# Patient Record
Sex: MALE | Race: WHITE
[De-identification: names, ages, dates, MRNs, and addresses within clinical notes are randomized per-mention and may not be internally consistent; named-entity substitution may affect disease eponyms.]

---

## 2018-02-01 ENCOUNTER — HOSPITAL ENCOUNTER (EMERGENCY)
Dept: HOSPITAL 11 - JP.ED | Age: 83
Discharge: NURSING, REHAB OR LONG-TERM CARE | End: 2018-02-01
Payer: MEDICARE

## 2018-02-01 DIAGNOSIS — E78.00: ICD-10-CM

## 2018-02-01 DIAGNOSIS — C92.00: ICD-10-CM

## 2018-02-01 DIAGNOSIS — Z88.1: ICD-10-CM

## 2018-02-01 DIAGNOSIS — I21.29: Primary | ICD-10-CM

## 2018-02-01 DIAGNOSIS — Z79.899: ICD-10-CM

## 2018-02-01 DIAGNOSIS — Z88.8: ICD-10-CM

## 2018-02-01 PROCEDURE — 96360 HYDRATION IV INFUSION INIT: CPT

## 2018-02-01 PROCEDURE — 99285 EMERGENCY DEPT VISIT HI MDM: CPT

## 2018-02-01 PROCEDURE — 74018 RADEX ABDOMEN 1 VIEW: CPT

## 2018-02-01 PROCEDURE — 36415 COLL VENOUS BLD VENIPUNCTURE: CPT

## 2018-02-01 PROCEDURE — 93005 ELECTROCARDIOGRAM TRACING: CPT

## 2018-02-01 PROCEDURE — 93010 ELECTROCARDIOGRAM REPORT: CPT

## 2018-02-01 PROCEDURE — 80048 BASIC METABOLIC PNL TOTAL CA: CPT

## 2018-02-01 PROCEDURE — 84484 ASSAY OF TROPONIN QUANT: CPT

## 2018-02-01 PROCEDURE — 87040 BLOOD CULTURE FOR BACTERIA: CPT

## 2018-02-01 PROCEDURE — 87804 INFLUENZA ASSAY W/OPTIC: CPT

## 2018-02-01 PROCEDURE — 96361 HYDRATE IV INFUSION ADD-ON: CPT

## 2018-02-01 PROCEDURE — 85025 COMPLETE CBC W/AUTO DIFF WBC: CPT

## 2018-02-01 PROCEDURE — 71045 X-RAY EXAM CHEST 1 VIEW: CPT

## 2018-02-01 PROCEDURE — 81001 URINALYSIS AUTO W/SCOPE: CPT

## 2018-02-01 NOTE — CR
Abdomen 1V Flat

 

HISTORY: Vomiting

 

COMPARISON: None

 

FINDINGS: Bowel gas pattern is nonobstructive. Scattered stool throughout nondilated colon. Mild dege
nerative change lumbar spine. Surgical clips in the left abdomen and pelvis.

 

Impression:

 

No evidence for acute abdominal process.

## 2018-02-01 NOTE — CR
Chest 1V Frontal

 

HISTORY: Hypoxia, weakness.

 

COMPARISON: CT chest 9/12/2012

 

FINDINGS: Rotated film moderately to the right extension waiting the mediastinal soft tissues to the 
right. The cardiac size is mildly enlarged. No acute congestive change or dense focal infiltrate.

 

Impression:

 

Rotated film. No focal acute infiltrates.

## 2018-02-01 NOTE — EDM.PDOC
ED HPI GENERAL MEDICAL PROBLEM





- General


Chief Complaint: General


Stated Complaint: MEDICAL VIA NORTH


Time Seen by Provider: 02/01/18 03:04


Source of Information: Reports: Family


History Limitations: Reports: Other (patient not talking at time of exam)





- History of Present Illness


INITIAL COMMENTS - FREE TEXT/NARRATIVE: 





84 yo male has been ill for 3 days. Family noted that he was too weak to walk 

tonight so called EMS to transport. Vomited twice yesterday. Incontinent of 

urine recently. Low grade fever. 


Onset: Gradual


Onset Date: 01/29/18


Duration: Day(s):, Getting Worse


Location: Reports: Generalized


Quality: Reports: Other (pain not reported)


Severity: Moderate


Improves with: Reports: None


Worsens with: Reports: Other (? time)


Context: Reports: Other (umknown)


Associated Symptoms: Reports: Malaise, Nausea/Vomiting, Weakness


Treatments PTA: Reports: Other (see below) (none)


  ** denies pain


Pain Score (Numeric/FACES): 0





- Related Data


 Allergies











Allergy/AdvReac Type Severity Reaction Status Date / Time


 


amoxicillin [From Augmentin] Allergy  Itching Verified 02/01/18 03:04


 


clavulanic acid Allergy  Itching Verified 02/01/18 03:04





[From Augmentin]     


 


dutasteride [From Avodart] Allergy  Other Verified 02/01/18 03:04


 


lisinopril Allergy  Other Verified 02/01/18 03:04


 


meperidine Allergy  Cannot Verified 02/01/18 03:04





   Remember  


 


metformin Allergy  Other Verified 02/01/18 03:04











Home Meds: 


 Home Meds





Fluticasone Propionate [Flonase] 2 sprays NS DAILY 02/01/18 [History]


Mupirocin Oint [Bactroban Oint] 1 applic TOP TID 02/01/18 [History]











Past Medical History


Cardiovascular History: Reports: High Cholesterol


Gastrointestinal History: Reports: Diverticulosis





- Past Surgical History


HEENT Surgical History: Reports: Detached Retina


GI Surgical History: Reports: Small Bowel





Social & Family History





- Tobacco Use


Smoking Status *Q: Never Smoker





- Caffeine Use


Caffeine Use: Reports: Coffee, Soda





- Recreational Drug Use


Recreational Drug Use: No





ED ROS GENERAL





- Review of Systems


Review Of Systems: See Below


Constitutional: Reports: Weakness


HEENT: Reports: No Symptoms


Respiratory: Reports: No Symptoms


Cardiovascular: Reports: No Symptoms


GI/Abdominal: Reports: Decreased Appetite, Nausea, Vomiting.  Denies: Abdominal 

Pain, Black Stool, Bloody Stool, Constipation, Diarrhea, Distension, Flatus, 

Hematemesis, Hematochezia, Melena


: Reports: No Symptoms


Musculoskeletal: Reports: No Symptoms


Skin: Reports: No Symptoms


Neurological: Reports: No Symptoms


Psychiatric: Reports: No Symptoms





ED EXAM, GENERAL





- Physical Exam


Exam: See Below


Exam Limited By: No Limitations


General Appearance: Alert, WD/WN, No Apparent Distress


Eye Exam: Bilateral Eye: Normal Inspection


Ears: Normal External Exam, Normal Canal, Hearing Grossly Normal, Normal TMs


Ear Exam: Bilateral Ear: Auricle Normal, Canal Normal, TM normal


Nose: Normal Inspection, Normal Mucosa, No Blood


Throat/Mouth: Normal Lips, No Airway Compromise, Other (dry oral mucosa)


Head: Atraumatic, Normocephalic


Neck: Normal Inspection, Supple


Respiratory/Chest: No Respiratory Distress, Lungs Clear, Normal Breath Sounds, 

No Accessory Muscle Use


Cardiovascular: Regular Rate, Rhythm, No Edema


GI/Abdominal: Normal Bowel Sounds, Soft, Distended (dullness with percussion.).

  No: No Mass, Guarding, Rigid, Rebound, Tender


Back Exam: Normal Inspection.  No: CVA Tenderness (R), CVA Tenderness (L)


Extremities: Normal Inspection, Normal Range of Motion, Non-Tender, No Pedal 

Edema


Neurological: No Motor/Sensory Deficits, Inattentive, Slow to Respond


Skin Exam: Warm, Dry, Intact, Normal Color, No Rash


Lymphatic: No Adenopathy





EKG INTERPRETATION


EKG Date: 02/01/18


Time: 04:05


Rhythm: A-Fib


Rate (Beats/Min): 85


Axis: Normal


P-Wave: Absent


QRS: Normal


ST-T: Depressed (V1-V5)


QT: Normal


Comparison: NA - No Prior EKG





Course





- Vital Signs


Last Recorded V/S: 


 Last Vital Signs











Temp  37.7 C   02/01/18 02:34


 


Pulse  70   02/01/18 02:34


 


Resp  21 H  02/01/18 02:34


 


BP  146/66 H  02/01/18 02:34


 


Pulse Ox  92 L  02/01/18 02:34














- Orders/Labs/Meds


Orders: 


 Active Orders 24 hr











 Category Date Time Status


 


 Cardiac Monitoring [RC] .As Directed Care  02/01/18 04:03 Active


 


 EKG Documentation Completion [RC] ASDIRECTED Care  02/01/18 04:02 Active


 


 Abdomen 1V Flat [CR] Stat Exams  02/01/18 03:02 Taken


 


 Chest 1V Frontal [CR] Stat Exams  02/01/18 03:02 Taken


 


 CULTURE BLOOD [BC] Stat Lab  02/01/18 04:00 Received


 


 CULTURE BLOOD [BC] Stat Lab  02/01/18 04:05 Received


 


 INFLUENZA A+B AG SCREEN [RM] Stat Lab  02/01/18 03:28 Ordered


 


 Sodium Chloride 0.9% [Normal Saline] 1,000 ml Med  02/01/18 04:30 Active





 IV ASDIRECTED   


 


 EKG 12 Lead [EK] Routine Ther  02/01/18 04:02 Ordered








 Medication Orders





Sodium Chloride (Normal Saline)  1,000 mls @ 200 mls/hr IV ASDIRECTED QUINTIN


   Last Admin: 02/01/18 04:24  Dose: 200 mls/hr








Labs: 


 Laboratory Tests











  02/01/18 02/01/18 02/01/18 Range/Units





  03:30 03:30 03:30 


 


WBC  Cancelled    


 


RBC  Cancelled    


 


Hgb  Cancelled    


 


Hct  Cancelled    


 


MCV  Cancelled    


 


MCH  Cancelled    


 


MCHC  Cancelled    


 


Plt Count  Cancelled    


 


Add Manual Diff     


 


Neutrophils % (Manual)     (36-66)  %


 


Band Neutrophils %     (5-11)  %


 


Lymphocytes % (Manual)     (24-44)  %


 


Monocytes % (Manual)     (2-6)  %


 


Eosinophils % (Manual)     (2-4)  %


 


Blast Cells %     %


 


Sodium   132 L   (140-148)  mmol/L


 


Potassium   4.2   (3.6-5.2)  mmol/L


 


Chloride   99 L   (100-108)  mmol/L


 


Carbon Dioxide   25   (21-32)  mmol/L


 


Anion Gap   12.2   (5.0-14.0)  mmol/L


 


BUN   22 H   (7-18)  mg/dL


 


Creatinine   1.2   (0.8-1.3)  mg/dL


 


Est Cr Clr Drug Dosing   46.47   mL/min


 


Estimated GFR (MDRD)   58 L   (>60)  


 


Glucose   127 H   ()  mg/dL


 


Calcium   8.9   (8.5-10.1)  mg/dL


 


Troponin I    0.351 H*  (0.000-0.056)  ng/mL


 


Urine Color     


 


Urine Appearance     


 


Urine pH     (4.5-8.0)  


 


Ur Specific Gravity     (1.008-1.030)  


 


Urine Protein     (NEGATIVE)  mg/dL


 


Urine Glucose (UA)     (NEGATIVE)  mg/dL


 


Urine Ketones     (NEGATIVE)  mg/dL


 


Urine Occult Blood     (NEGATIVE)  


 


Urine Nitrite     (NEGAITVE)  


 


Urine Bilirubin     (NEGATIVE)  


 


Urine Urobilinogen     (NORMAL)  mg/dL


 


Ur Leukocyte Esterase     (NEGATIVE)  


 


Urine RBC     (0-5)  


 


Urine WBC     (0-5)  


 


Ur Epithelial Cells     


 


Amorphous Sediment     


 


Urine Bacteria     


 


Urine Mucus     














  02/01/18 02/01/18 Range/Units





  03:30 03:51 


 


WBC  68.0 H*   


 


RBC  2.65 L   


 


Hgb  8.1 L   


 


Hct  24.6 L   


 


MCV  93   


 


MCH  31   


 


MCHC  33   


 


Plt Count  22 L*   


 


Add Manual Diff  Yes   


 


Neutrophils % (Manual)  11 L   (36-66)  %


 


Band Neutrophils %  5   (5-11)  %


 


Lymphocytes % (Manual)  20 L   (24-44)  %


 


Monocytes % (Manual)  13 H   (2-6)  %


 


Eosinophils % (Manual)  3   (2-4)  %


 


Blast Cells %  48   %


 


Sodium    (140-148)  mmol/L


 


Potassium    (3.6-5.2)  mmol/L


 


Chloride    (100-108)  mmol/L


 


Carbon Dioxide    (21-32)  mmol/L


 


Anion Gap    (5.0-14.0)  mmol/L


 


BUN    (7-18)  mg/dL


 


Creatinine    (0.8-1.3)  mg/dL


 


Est Cr Clr Drug Dosing    mL/min


 


Estimated GFR (MDRD)    (>60)  


 


Glucose    ()  mg/dL


 


Calcium    (8.5-10.1)  mg/dL


 


Troponin I    (0.000-0.056)  ng/mL


 


Urine Color   Yellow  


 


Urine Appearance   Clear  


 


Urine pH   5.0  (4.5-8.0)  


 


Ur Specific Gravity   1.020  (1.008-1.030)  


 


Urine Protein   Negative  (NEGATIVE)  mg/dL


 


Urine Glucose (UA)   Normal  (NEGATIVE)  mg/dL


 


Urine Ketones   Negative  (NEGATIVE)  mg/dL


 


Urine Occult Blood   Moderate  (NEGATIVE)  


 


Urine Nitrite   Negative  (NEGAITVE)  


 


Urine Bilirubin   Negative  (NEGATIVE)  


 


Urine Urobilinogen   Normal  (NORMAL)  mg/dL


 


Ur Leukocyte Esterase   Negative  (NEGATIVE)  


 


Urine RBC   0-5  (0-5)  


 


Urine WBC   0-5  (0-5)  


 


Ur Epithelial Cells   Rare  


 


Amorphous Sediment   Not seen  


 


Urine Bacteria   Rare  


 


Urine Mucus   Not seen  











Meds: 


Medications











Generic Name Dose Route Start Last Admin





  Trade Name Freq  PRN Reason Stop Dose Admin


 


Sodium Chloride  1,000 mls @ 200 mls/hr  02/01/18 04:30  02/01/18 04:24





  Normal Saline  IV   200 mls/hr





  ASDIRECTED QUINTIN   Administration














Discontinued Medications














Generic Name Dose Route Start Last Admin





  Trade Name Chago  PRN Reason Stop Dose Admin


 


Lactated Ringer's  1,000 mls @ 1,000 mls/hr  02/01/18 03:01  02/01/18 03:27





  Ringers, Lactated  IV  02/01/18 04:00  1,000 mls/hr





  BOLUS ONE   Administration














- Radiology Interpretation


Free Text/Narrative:: 





CXR-rotated, ? negative


Abdominal X-ray-negative





Departure





- Departure


Time of Disposition: 04:55


Disposition: DC/Tfer to [facility] 02


Condition: Serious


Clinical Impression: 


 Posterior MI





Leukemia


Qualifiers:


 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 

leukemia, not having achieved remission





Clinical Impression: 


 (Ruled Out): AML (acute myeloblastic leukemia)





- Discharge Information


Referrals: 


Best Nance MD [Primary Care Provider] - 


Forms:  ED Department Discharge





- My Orders


Last 24 Hours: 


My Active Orders





02/01/18 03:02


Abdomen 1V Flat [CR] Stat 


Chest 1V Frontal [CR] Stat 





02/01/18 03:28


INFLUENZA A+B AG SCREEN [RM] Stat 





02/01/18 04:00


CULTURE BLOOD [BC] Stat 





02/01/18 04:02


EKG Documentation Completion [RC] ASDIRECTED 


EKG 12 Lead [EK] Routine 





02/01/18 04:03


Cardiac Monitoring [RC] .As Directed 





02/01/18 04:05


CULTURE BLOOD [BC] Stat 





02/01/18 04:30


Sodium Chloride 0.9% [Normal Saline] 1,000 ml IV ASDIRECTED 














- Assessment/Plan


Last 24 Hours: 


My Active Orders





02/01/18 03:02


Abdomen 1V Flat [CR] Stat 


Chest 1V Frontal [CR] Stat 





02/01/18 03:28


INFLUENZA A+B AG SCREEN [RM] Stat 





02/01/18 04:00


CULTURE BLOOD [BC] Stat 





02/01/18 04:02


EKG Documentation Completion [RC] ASDIRECTED 


EKG 12 Lead [EK] Routine 





02/01/18 04:03


Cardiac Monitoring [RC] .As Directed 





02/01/18 04:05


CULTURE BLOOD [BC] Stat 





02/01/18 04:30


Sodium Chloride 0.9% [Normal Saline] 1,000 ml IV ASDIRECTED